# Patient Record
Sex: MALE | Race: BLACK OR AFRICAN AMERICAN | NOT HISPANIC OR LATINO | Employment: UNEMPLOYED | URBAN - METROPOLITAN AREA
[De-identification: names, ages, dates, MRNs, and addresses within clinical notes are randomized per-mention and may not be internally consistent; named-entity substitution may affect disease eponyms.]

---

## 2018-10-03 RX ORDER — FLUTICASONE PROPIONATE 50 MCG
1 SPRAY, SUSPENSION (ML) NASAL 2 TIMES DAILY
COMMUNITY
Start: 2015-01-06

## 2018-10-04 ENCOUNTER — OFFICE VISIT (OUTPATIENT)
Dept: FAMILY MEDICINE CLINIC | Facility: CLINIC | Age: 33
End: 2018-10-04
Payer: COMMERCIAL

## 2018-10-04 VITALS
HEIGHT: 69 IN | WEIGHT: 149 LBS | DIASTOLIC BLOOD PRESSURE: 72 MMHG | TEMPERATURE: 98.1 F | BODY MASS INDEX: 22.07 KG/M2 | SYSTOLIC BLOOD PRESSURE: 110 MMHG | HEART RATE: 78 BPM

## 2018-10-04 DIAGNOSIS — R31.0 GROSS HEMATURIA: Primary | ICD-10-CM

## 2018-10-04 DIAGNOSIS — Z23 ENCOUNTER FOR IMMUNIZATION: ICD-10-CM

## 2018-10-04 LAB
SL AMB  POCT GLUCOSE, UA: ABNORMAL
SL AMB LEUKOCYTE ESTERASE,UA: ABNORMAL
SL AMB POCT BILIRUBIN,UA: ABNORMAL
SL AMB POCT BLOOD,UA: ABNORMAL
SL AMB POCT CLARITY,UA: ABNORMAL
SL AMB POCT COLOR,UA: YELLOW
SL AMB POCT KETONES,UA: ABNORMAL
SL AMB POCT NITRITE,UA: ABNORMAL
SL AMB POCT PH,UA: 7
SL AMB POCT SPECIFIC GRAVITY,UA: 1.02
SL AMB POCT URINE PROTEIN: ABNORMAL
SL AMB POCT UROBILINOGEN: ABNORMAL

## 2018-10-04 PROCEDURE — 87186 SC STD MICRODIL/AGAR DIL: CPT | Performed by: NURSE PRACTITIONER

## 2018-10-04 PROCEDURE — 87086 URINE CULTURE/COLONY COUNT: CPT | Performed by: NURSE PRACTITIONER

## 2018-10-04 PROCEDURE — 87591 N.GONORRHOEAE DNA AMP PROB: CPT | Performed by: NURSE PRACTITIONER

## 2018-10-04 PROCEDURE — 99203 OFFICE O/P NEW LOW 30 MIN: CPT | Performed by: NURSE PRACTITIONER

## 2018-10-04 PROCEDURE — 87077 CULTURE AEROBIC IDENTIFY: CPT | Performed by: NURSE PRACTITIONER

## 2018-10-04 PROCEDURE — 87491 CHLMYD TRACH DNA AMP PROBE: CPT | Performed by: NURSE PRACTITIONER

## 2018-10-04 PROCEDURE — 81002 URINALYSIS NONAUTO W/O SCOPE: CPT | Performed by: NURSE PRACTITIONER

## 2018-10-04 RX ORDER — CIPROFLOXACIN 500 MG/1
500 TABLET, FILM COATED ORAL EVERY 12 HOURS SCHEDULED
Qty: 10 TABLET | Refills: 0 | Status: SHIPPED | OUTPATIENT
Start: 2018-10-04 | End: 2018-10-09

## 2018-10-04 NOTE — PROGRESS NOTES
Chief Complaint   Patient presents with    Annual Exam     35 y/o male; pt is here for routine PE  Assessment/Plan:     Diagnoses and all orders for this visit:    Gross hematuria  -     Chlamydia/GC amplified DNA by PCR; Future  -     POCT urine dip  -     Urine culture  -     Chlamydia/GC amplified DNA by PCR  -     ciprofloxacin (CIPRO) 500 mg tablet; Take 1 tablet (500 mg total) by mouth every 12 (twelve) hours for 5 days    Encounter for immunization    Other orders  -     Cancel: TDAP VACCINE GREATER THAN OR EQUAL TO 6YO IM  -     Cancel: SYRINGE/SINGLE-DOSE VIAL: influenza vaccine, [id number], quadrivalent, 0 5 mL, preservative-free, for patients 3+ yr (FLUZONE)          Subjective:      Patient ID: Solitario Elder is a 34 y o  male  Difficulty Urinating    This is a new problem  The current episode started yesterday  The problem occurs intermittently  The problem has been gradually worsening  The quality of the pain is described as aching and burning  The pain is moderate  There has been no fever  He is sexually active  There is no history of pyelonephritis  Associated symptoms include hematuria, nausea and urgency  Pertinent negatives include no chills, discharge or flank pain  He has tried nothing for the symptoms  The treatment provided no relief  The following portions of the patient's history were reviewed and updated as appropriate: allergies, current medications, past family history, past medical history, past social history, past surgical history and problem list     Review of Systems   Constitutional: Negative for chills, fatigue and fever  HENT: Negative for congestion and trouble swallowing  Respiratory: Positive for chest tightness  Negative for cough and shortness of breath  Cardiovascular: Negative for chest pain, palpitations and leg swelling  Gastrointestinal: Positive for abdominal pain (lower abdomen ) and nausea          No heart burn symptoms    Endocrine: Negative for polydipsia, polyphagia and polyuria  Genitourinary: Positive for dysuria, hematuria and urgency  Negative for difficulty urinating, discharge, flank pain, genital sores, scrotal swelling and testicular pain  Objective:      /72   Pulse 78   Temp 98 1 °F (36 7 °C)   Ht 5' 9" (1 753 m)   Wt 67 6 kg (149 lb)   BMI 22 00 kg/m²          Physical Exam   Constitutional: He appears well-developed and well-nourished  HENT:   Head: Normocephalic and atraumatic  Mouth/Throat: Uvula is midline, oropharynx is clear and moist and mucous membranes are normal    Eyes: Pupils are equal, round, and reactive to light  Cardiovascular: Normal rate, regular rhythm and normal heart sounds  No murmur heard  Pulmonary/Chest: Effort normal  No respiratory distress  Abdominal: Soft  Bowel sounds are increased  There is tenderness in the suprapubic area  There is no CVA tenderness  Lymphadenopathy:     He has no cervical adenopathy  Psychiatric: He has a normal mood and affect   His behavior is normal

## 2018-10-05 LAB
CHLAMYDIA DNA CVX QL NAA+PROBE: NORMAL
N GONORRHOEA DNA GENITAL QL NAA+PROBE: NORMAL

## 2018-10-06 LAB — BACTERIA UR CULT: ABNORMAL

## 2018-10-08 ENCOUNTER — TELEPHONE (OUTPATIENT)
Dept: FAMILY MEDICINE CLINIC | Facility: CLINIC | Age: 33
End: 2018-10-08

## 2018-10-08 NOTE — TELEPHONE ENCOUNTER
----- Message from [address] [place] [address] sent at 10/7/2018 10:05 PM EDT -----  Negative gonorrhea and chlamydia  positive urinary tract infection

## 2025-04-17 ENCOUNTER — TELEPHONE (OUTPATIENT)
Dept: FAMILY MEDICINE CLINIC | Facility: CLINIC | Age: 40
End: 2025-04-17

## 2025-04-17 NOTE — TELEPHONE ENCOUNTER
Patient established care with Rachid Jc MD   [address] [facility]   [place] [address]   [phone] (Work)   [phone] (Fax)

## 2025-04-24 NOTE — TELEPHONE ENCOUNTER
04/24/25 11:46 AM        The office's request has been received, reviewed, and the patient chart updated. The PCP has successfully been removed with a patient attribution note. This message will now be completed.        Thank you  Ayush Jose